# Patient Record
Sex: FEMALE | Race: WHITE | NOT HISPANIC OR LATINO | URBAN - METROPOLITAN AREA
[De-identification: names, ages, dates, MRNs, and addresses within clinical notes are randomized per-mention and may not be internally consistent; named-entity substitution may affect disease eponyms.]

---

## 2022-05-20 ENCOUNTER — PATIENT ROUNDING (BHMG ONLY) (OUTPATIENT)
Dept: OBSTETRICS AND GYNECOLOGY | Age: 22
End: 2022-05-20

## 2022-05-20 ENCOUNTER — OFFICE VISIT (OUTPATIENT)
Dept: OBSTETRICS AND GYNECOLOGY | Age: 22
End: 2022-05-20

## 2022-05-20 VITALS
DIASTOLIC BLOOD PRESSURE: 64 MMHG | BODY MASS INDEX: 21.04 KG/M2 | SYSTOLIC BLOOD PRESSURE: 102 MMHG | WEIGHT: 138.8 LBS | HEIGHT: 68 IN

## 2022-05-20 DIAGNOSIS — Z01.411 ENCOUNTER FOR GYNECOLOGICAL EXAMINATION WITH ABNORMAL FINDING: ICD-10-CM

## 2022-05-20 DIAGNOSIS — N92.0 MENORRHAGIA WITH REGULAR CYCLE: ICD-10-CM

## 2022-05-20 DIAGNOSIS — Z11.3 SCREEN FOR SEXUALLY TRANSMITTED DISEASES: ICD-10-CM

## 2022-05-20 DIAGNOSIS — Z12.4 SCREENING FOR MALIGNANT NEOPLASM OF CERVIX: Primary | ICD-10-CM

## 2022-05-20 PROBLEM — F41.1 GAD (GENERALIZED ANXIETY DISORDER): Status: ACTIVE | Noted: 2017-06-08

## 2022-05-20 PROCEDURE — 99385 PREV VISIT NEW AGE 18-39: CPT | Performed by: NURSE PRACTITIONER

## 2022-05-20 RX ORDER — ONDANSETRON 4 MG/1
4 TABLET, FILM COATED ORAL EVERY 6 HOURS PRN
COMMUNITY
Start: 2022-04-10

## 2022-05-20 RX ORDER — AMOXICILLIN 500 MG/1
CAPSULE ORAL
COMMUNITY
Start: 2022-05-03

## 2022-05-20 NOTE — PROGRESS NOTES
A MY CHART MESSAGE HAS BEEN SENT TO THE PATIENT FOR [facility] [place] [facility].

## 2022-05-20 NOTE — PROGRESS NOTES
[facility]   Obstetrics and Gynecology       2022    Patient: Kelly Stovall          MR#:[id number]    History of Present Illness    Chief Complaint   Patient presents with   • Gynecologic Exam     New GYN, Annual exam, Pt has never had pap smear performed, Pt C/O painful menstrual cycles, cramps and nausea        21 y.o. female  who presents for annual exam. She just got  a few months ago. She and her  are trying for pregnancy. She is taking daily PNV. She has monthly periods that are very heavy with clots and cramping for the first two days. She has never had a pap smear or pelvic exam.     Studies reviewed:    Patient's last menstrual period was 2022 (exact date).  Obstetric History:  OB History        0    Para   0    Term   0       0    AB   0    Living   0       SAB   0    IAB   0    Ectopic   0    Molar   0    Multiple   0    Live Births   0               Menstrual History:     Patient's last menstrual period was 2022 (exact date).       Sexual History:       ________________________________________  Patient Active Problem List   Diagnosis   • Arthralgia of multiple joints   • Madisyn-Danlos syndrome   • RYAN (generalized anxiety disorder)     Past Medical History:   Diagnosis Date   • Tubbs's esophagus      Past Surgical History:   Procedure Laterality Date   • COLONOSCOPY W/ BIOPSIES     • ENDOSCOPY     • KNEE ACL RECONSTRUCTION Bilateral    • KNEE MENISCAL REPAIR Right    • WISDOM TOOTH EXTRACTION       Social History     Tobacco Use   Smoking Status Never Smoker   Smokeless Tobacco Never Used     Family History   Problem Relation Age of Onset   • Colon cancer Maternal Aunt    • Colon cancer Maternal Uncle    • Breast cancer Neg Hx    • Ovarian cancer Neg Hx    • Uterine cancer Neg Hx      Prior to Admission medications    Not on File     ________________________________________    Current contraception: none  History of abnormal Pap smear:  no  Family history of uterine or ovarian cancer: no  Family History of colon cancer/colon polyps: no  History of abnormal mammogram: no  History of abnormal lipids: no    The following portions of the patient's history were reviewed and updated as appropriate: allergies, current medications, past family history, past medical history, past social history, past surgical history, and problem list.    Review of Systems   Constitutional: Negative for activity change, appetite change, chills, fatigue and fever.   Respiratory: Negative for cough and shortness of breath.    Cardiovascular: Negative for chest pain.   Gastrointestinal: Negative for constipation, diarrhea, nausea and vomiting.   Genitourinary: Positive for menstrual problem. Negative for dysuria, flank pain, genital sores, hematuria and vaginal bleeding.            Objective   Physical Exam  Constitutional:       Appearance: Normal appearance.   HENT:      Head: Normocephalic and atraumatic.      Nose: Nose normal.      Mouth/Throat:      Mouth: Mucous membranes are moist.   Pulmonary:      Effort: Pulmonary effort is normal.   Chest:   Breasts:      Right: Normal. No mass or nipple discharge.      Left: Normal. No mass or nipple discharge.       Abdominal:      General: Abdomen is flat.      Palpations: Abdomen is soft.   Genitourinary:     General: Normal vulva.      Exam position: Lithotomy position.      Labia:         Right: No rash, tenderness or lesion.         Left: No rash, tenderness or lesion.       Vagina: Normal. No signs of injury.      Cervix: Normal.      Uterus: Normal.       Adnexa: Right adnexa normal and left adnexa normal.      Rectum: Normal.   Musculoskeletal:         General: Normal range of motion.      Cervical back: Normal range of motion.   Skin:     General: Skin is warm and dry.      Findings: Rash ( poison ivy on bilateral arms) present.   Neurological:      Mental Status: She is alert.   Psychiatric:         Mood and Affect:  "Mood normal.         Behavior: Behavior normal.         /64   Ht 172.7 cm (68\")   Wt 63 kg (138 lb 12.8 oz)   LMP 05/17/2022 (Exact Date)   Breastfeeding No   BMI 21.10 kg/m²    BP Readings from Last 3 Encounters:   05/20/22 102/64      Wt Readings from Last 3 Encounters:   05/20/22 63 kg (138 lb 12.8 oz)        BMI: Estimated body mass index is 21.1 kg/m² as calculated from the following:    Height as of this encounter: 172.7 cm (68\").    Weight as of this encounter: 63 kg (138 lb 12.8 oz).    Counseling:  --Nutrition: Stressed importance of moderation and caloric balance, stressed fresh fruit and vegetables  --Exercise: Stressed the importance of regular exercise. 3-5 times weekly   - Discussed screening mammogram recommendations.   --Discussed benefits of screening colonoscopy- age 45 unless FH  --Discussed pap smear screening recommendations             Assessment:  Diagnoses and all orders for this visit:    1. Screening for malignant neoplasm of cervix (Primary)  -     Cancel: Chlamydia trachomatis, Neisseria gonorrhoeae, Trichomonas vaginalis, PCR - Swab, Cervix    2. Screen for sexually transmitted diseases  -     IGP,rfx Aptima HPV All Pth    3. Menorrhagia with regular cycle    4. Encounter for gynecological examination with abnormal finding    continue tracking cycles   Daily PNV, return with positive HPT    Plan:  Return for Annual physical.    TIFFANY Jaime  5/20/2022 14:48 EDT   "

## 2022-05-25 LAB
CONV .: NORMAL
CYTOLOGIST CVX/VAG CYTO: NORMAL
CYTOLOGY CVX/VAG DOC CYTO: NORMAL
CYTOLOGY CVX/VAG DOC THIN PREP: NORMAL
DX ICD CODE: NORMAL
HIV 1 & 2 AB SER-IMP: NORMAL
OTHER STN SPEC: NORMAL
STAT OF ADQ CVX/VAG CYTO-IMP: NORMAL